# Patient Record
(demographics unavailable — no encounter records)

---

## 2024-10-30 NOTE — ADDENDUM
[FreeTextEntry1] :  Documented by Tramaine Bar acting as a scribe for JIMBO Mcdonough  on 10/30/2024  .

## 2024-10-30 NOTE — END OF VISIT
[FreeTextEntry3] :  All medical record entries made by the Scribe were at my, JIMBO Pichardo , direction and personally dictated by me on 10/30/2024 . I have reviewed the chart and agree that the record accurately reflects my personal performance of the history, physical exam, assessment and plan. I have also personally directed, reviewed, and agreed with the chart.  [Time Spent: ___ minutes] : I have spent [unfilled] minutes of time on the encounter which excludes teaching and separately reported services.

## 2024-10-30 NOTE — PLAN
[FreeTextEntry1] : Will begin bariatric workup with UGI and EGD to move forward with RYGB conversion to MDS

## 2024-10-30 NOTE — HISTORY OF PRESENT ILLNESS
[de-identified] : Lucero Arias is a 32 y/o F s/p RYGB in 2016 who presents today for a follow up. Previously discussed possibility of revisional surgery. Patient states that prior to gastric bypass she weighed around 290 lbs, went down to 130 lbs, and today weighs 234 lbs. Labs show prediabetes, elevated PTH, low vitamin D (placed on Vitamin D) as well as low iron and ferritin (placed on iron). EGD and UGI series have been reordered. Discussed that can convert to more aggressive operation such as MDS but it is imperative that she gets adequate protein intake, adds resistance training, takes supplements as prescribed as weight is increasing but she does have signs of low micronutrient levels secondary to bypass which can be worsened or progress either without attention to detail or another surgical procedure. Will begin bariatric workup to move forward with possible RYGB conversion MDS.

## 2024-10-30 NOTE — ASSESSMENT
[FreeTextEntry1] : Lucero Arias is a 30 y/o F s/p RYGB in 2016 who presents today for a follow up. Previously discussed possibility of revisional surgery. Patient states that prior to gastric bypass she weighed around 290 lbs, went down to 130 lbs, and today weighs 234 lbs. Labs show prediabetes, elevated PTH, low vitamin D (placed on Vitamin D) as well as low iron and ferritin (placed on iron). EGD and UGI series have been reordered. Discussed that can convert to more aggressive operation such as MDS but it is imperative that she gets adequate protein intake, adds resistance training, takes supplements as prescribed as weight is increasing but she does have signs of low micronutrient levels secondary to bypass which can be worsened or progress either without attention to detail or another surgical procedure. Will begin bariatric workup to move forward with possible RYGB conversion MDS.

## 2024-11-10 NOTE — HISTORY OF PRESENT ILLNESS
[FreeTextEntry1] : HPI- 31F PMHx RYGB in 2016, Class II Obesity (BMI 38.96), pre-DM, FLAQUITO referred by Dr Healy for EGD prior to consideration of revisional bariatric surgery.  Being considered for modified duodenal switch.  Weight prior to gastric bypass: 290 lbs José: 130 lbs Today weighs: 234 lbs.   Pending UGIS  BW: Fe 33 (L) (8/14/24) TIBC 509 (H) (8/14/24) Ferritin 4 (L) (8/14/24) % Sat 6 (L) (8/14/24) Cr 0.60 (8/14/24) TB 0.3 (8/14/24) Alk phos 81 (8/14/24) AST 20 (8/14/24) ALT 14 (8/14/24) Hgb 11.1 (8/14/24)  Referred by -  Dr Healy  PMHx - PSHx -  RYGB in 2016 Rx - Supplements/herbs/OTC -  A/C or NSAIDs? -  FMHx - Allergies - EtOH - Smoking -  Drugs -  Diet -  Labs/Stool Tests - Breath Tests -  Imaging -  EGD -  Colonoscopy -

## 2024-12-12 NOTE — PHYSICAL EXAM
[Alert] : alert [Normal Voice/Communication] : normal voice/communication [Healthy Appearing] : healthy appearing [No Acute Distress] : no acute distress [Sclera] : the sclera and conjunctiva were normal [Hearing Threshold Finger Rub Not [place]] : hearing was normal [Normal Lips/Gums] : the lips and gums were normal [Oropharynx] : the oropharynx was normal [Normal Appearance] : the appearance of the neck was normal [No Neck Mass] : no neck mass was observed [No Respiratory Distress] : no respiratory distress [No Acc Muscle Use] : no accessory muscle use [Respiration, Rhythm And Depth] : normal respiratory rhythm and effort [Abdomen Tenderness] : non-tender [No Masses] : no abdominal mass palpated [Abdomen Soft] : soft [Cervical Lymph Nodes Enlarged Posterior Bilaterally] : no posterior cervical lymphadenopathy [Supraclavicular Lymph Nodes Enlarged Bilaterally] : no supraclavicular lymphadenopathy [Axillary Lymph Nodes Enlarged Bilaterally] : no axillary lymphadenopathy [No CVA Tenderness] : no CVA  tenderness [No Spinal Tenderness] : no spinal tenderness [Abnormal Walk] : normal gait [No Clubbing, Cyanosis] : no clubbing or cyanosis of the fingernails [No Joint Swelling] : no joint swelling seen [Normal Color / Pigmentation] : normal skin color and pigmentation [] : no rash [Cranial Nerves Intact] : cranial nerves 2-12 were intact [Sensation] : the sensory exam was normal to light touch and pinprick [Motor Exam] : the motor exam was normal [Oriented To Time, Place, And Person] : oriented to person, place, and time

## 2024-12-12 NOTE — REVIEW OF SYSTEMS
[Recent Weight Gain (___ Lbs)] : recent [unfilled] ~Ulb weight gain [As Noted in HPI] : as noted in HPI [Abdominal Pain] : abdominal pain [Negative] : Heme/Lymph

## 2024-12-12 NOTE — ASSESSMENT
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   #RYGB  #Weight regain  #FLAQUITO History of RYGB, with weight regain, being evaluated for MDS  History of FLAQUITO, likely 2/2 RYGB  -Will plan for EGD at [facility] pending schedule availability. R/B/A/L discussed with patient. All questions answered to patient's satisfaction. -Reviewed most recent BW  -c/w Fe supplementation, would consider for IV Fe if with inadequate response to PO Fe

## 2024-12-12 NOTE — HISTORY OF PRESENT ILLNESS
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   Patient states having had her original bypass procedure at [facility], with pre-bariatric weight at 298 lb. José weight was 135 lb  Notes regaining approx 100 lb back, now at 235 lb. States that she believes her weight regain happened after having her 3 kids and due to life stressors including the loss of her brother from complications related to a shooting and her father being diagnosed with alcohol related cirrhosis. Currently on a transplant list.  Believes that her weight gain is related to increased PO intake related to the above noted stressors. Being evaluated for MDS by Dr Healy.   Occasionally has a burning sensation in the pit of her stomach. No reflux. No nausea/vomiting Has regular, daily BMs. non-bloody stools.  On Fe supplementation, started before August. 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 - denies FMHx - mother - ovarian cancer (23); father - on transplant liver for alcoholic cirrhosis; no IBD Allergies - NKDA EtOH - socially Smoking - never smoker Drugs - denies  EGD - no prior  Colonoscopy - no prior

## 2025-02-05 NOTE — PLAN
[FreeTextEntry1] :  Advised patient to discuss plan with significant family members and decide whether she wants to move forward with revisional surgery. Follow up thereafter.

## 2025-02-05 NOTE — ASSESSMENT
[FreeTextEntry1] : Lucero Arias is a 31 year old female patient with history of RYGB who presents for a follow up visit today. Patient has a BMI of 40 and has lost approximately 150 pounds and regained 80 pounds. Workup shows bypass relatively intact. Patient does have increase glucose variability. Discussed the option including medical therapy, distillation, conversion to DS, which was my preferred option. But explained to the patient that conversion to DS comes with increased risk and a complication rate of 3%. Patient already has decreased Vitamin D levels which we will supplement. Elevated PTH and low iron. Advised patient to discuss plan with significant family members and decide whether she wants to move forward with revisional surgery. Follow up thereafter.

## 2025-02-05 NOTE — END OF VISIT
[FreeTextEntry3] :  All medical record entries made by the Scribe were at my, JIMBO Pichardo , direction and personally dictated by me on 02/05/2025 . I have reviewed the chart and agree that the record accurately reflects my personal performance of the history, physical exam, assessment and plan. I have also personally directed, reviewed, and agreed with the chart. [Time Spent: ___ minutes] : I have spent [unfilled] minutes of time on the encounter which excludes teaching and separately reported services.

## 2025-02-05 NOTE — HISTORY OF PRESENT ILLNESS
[de-identified] : Lucero Arias is a 31 year old female patient with history of RYGB who presents for a follow up visit today. Patient has a BMI of 40 and has lost approximately 150 pounds and regained 80 pounds. Workup shows bypass relatively intact. Patient does have increase glucose variability. Discussed the option including medical therapy, distillation, conversion to DS, which was my preferred option. But explained to the patient that conversion to DS comes with increased risk and a complication rate of 3%. Patient already has decreased Vitamin D levels which we will supplement. Elevated PTH and low iron. Advised patient to discuss plan with significant family members and decide whether she wants to move forward with revisional surgery. Follow up thereafter.